# Patient Record
Sex: FEMALE | Race: WHITE | NOT HISPANIC OR LATINO | URBAN - METROPOLITAN AREA
[De-identification: names, ages, dates, MRNs, and addresses within clinical notes are randomized per-mention and may not be internally consistent; named-entity substitution may affect disease eponyms.]

---

## 2024-01-20 ENCOUNTER — OFFICE VISIT (OUTPATIENT)
Dept: URGENT CARE | Facility: CLINIC | Age: 59
End: 2024-01-20
Payer: COMMERCIAL

## 2024-01-20 VITALS
OXYGEN SATURATION: 97 % | RESPIRATION RATE: 16 BRPM | DIASTOLIC BLOOD PRESSURE: 86 MMHG | SYSTOLIC BLOOD PRESSURE: 135 MMHG | HEART RATE: 90 BPM | TEMPERATURE: 96.4 F | WEIGHT: 135 LBS

## 2024-01-20 DIAGNOSIS — M51.16 SCIATICA DUE TO DISPLACEMENT OF LUMBAR INTERVERTEBRAL DISC: ICD-10-CM

## 2024-01-20 DIAGNOSIS — M54.50 LUMBAR BACK PAIN: Primary | ICD-10-CM

## 2024-01-20 PROCEDURE — 99203 OFFICE O/P NEW LOW 30 MIN: CPT | Performed by: PHYSICIAN ASSISTANT

## 2024-01-20 PROCEDURE — 1036F TOBACCO NON-USER: CPT | Performed by: PHYSICIAN ASSISTANT

## 2024-01-20 RX ORDER — PREDNISONE 20 MG/1
20 TABLET ORAL 2 TIMES DAILY
Qty: 6 TABLET | Refills: 0 | Status: SHIPPED | OUTPATIENT
Start: 2024-01-20 | End: 2024-01-23

## 2024-01-20 RX ORDER — CYCLOBENZAPRINE HCL 10 MG
10 TABLET ORAL 3 TIMES DAILY PRN
Qty: 30 TABLET | Refills: 0 | Status: SHIPPED | OUTPATIENT
Start: 2024-01-20 | End: 2024-01-30

## 2024-01-20 ASSESSMENT — ENCOUNTER SYMPTOMS: BACK PAIN: 1

## 2024-01-20 ASSESSMENT — PAIN SCALES - GENERAL: PAINLEVEL: 5

## 2024-01-20 NOTE — PROGRESS NOTES
Subjective   Patient ID: Shaina Self is a 58 y.o. female.    Patient is a 58-year-old female who complains of left lumbar back pain with radiation to her left buttock and leg that she has been experiencing for the past 2 days.  Patient has a long history of degenerative disc disease to her lumbar spine and is seeing a specialist for same.  Patient denies recent accident or injury for her symptoms.  Patient reports that she typically sits at work for 8 hours and reports that at times this does aggravate her chronic back pain and sciatica symptoms.  Patient denies paresthesia or paralysis to her bilateral legs and states she is bearing weight and ambulating without difficulty.  Patient states she has full control of bowel and bladder denies episodes of incontinence.  Patient explains that she has been prescribed Flexeril in the past for similar presentations which has worked well.  Patient reports no radiation of her pain to her right buttock and leg.      Back Pain    The following portions of the chart were reviewed this encounter and updated as appropriate:  Allergies       Review of Systems   Musculoskeletal:  Positive for back pain.   All other systems reviewed and are negative.  Objective   Physical Exam  Vitals and nursing note reviewed.   Constitutional:       Appearance: Normal appearance. She is normal weight.   HENT:      Head: Normocephalic.      Mouth/Throat:      Mouth: Mucous membranes are moist.      Pharynx: Oropharynx is clear.   Eyes:      Extraocular Movements: Extraocular movements intact.      Conjunctiva/sclera: Conjunctivae normal.      Pupils: Pupils are equal, round, and reactive to light.   Cardiovascular:      Rate and Rhythm: Normal rate.      Pulses: Normal pulses.   Pulmonary:      Effort: Pulmonary effort is normal.      Breath sounds: Normal breath sounds.   Abdominal:      General: Abdomen is flat.      Palpations: Abdomen is soft.   Musculoskeletal:         General: No swelling,  tenderness, deformity or signs of injury. Normal range of motion.      Cervical back: Normal range of motion and neck supple.      Right lower leg: No edema.      Left lower leg: No edema.   Skin:     General: Skin is warm and dry.      Capillary Refill: Capillary refill takes less than 2 seconds.      Findings: No bruising or erythema.   Neurological:      General: No focal deficit present.      Mental Status: She is alert and oriented to person, place, and time.      Motor: No weakness.      Gait: Gait normal.   Psychiatric:         Mood and Affect: Mood normal.         Behavior: Behavior normal.         Thought Content: Thought content normal.         Judgment: Judgment normal.     Assessment/Plan   Physical exam findings as noted above.  Patient was provided with prescriptions for Flexeril 10 mg and prednisone 20 mg.  Supportive care instructions were discussed and the patient verbalizes good understanding of same.    CLINICAL IMPRESSION:  Lumbar Back Pain;  Sciatica Left Leg    Diagnoses and all orders for this visit:  Lumbar back pain  -     predniSONE (Deltasone) 20 mg tablet; Take 1 tablet (20 mg) by mouth 2 times a day for 3 days.  -     cyclobenzaprine (Flexeril) 10 mg tablet; Take 1 tablet (10 mg) by mouth 3 times a day as needed for muscle spasms for up to 10 days.  Sciatica due to displacement of lumbar intervertebral disc  -     predniSONE (Deltasone) 20 mg tablet; Take 1 tablet (20 mg) by mouth 2 times a day for 3 days.  -     cyclobenzaprine (Flexeril) 10 mg tablet; Take 1 tablet (10 mg) by mouth 3 times a day as needed for muscle spasms for up to 10 days.